# Patient Record
Sex: FEMALE | Race: BLACK OR AFRICAN AMERICAN | NOT HISPANIC OR LATINO | Employment: UNEMPLOYED | URBAN - METROPOLITAN AREA
[De-identification: names, ages, dates, MRNs, and addresses within clinical notes are randomized per-mention and may not be internally consistent; named-entity substitution may affect disease eponyms.]

---

## 2017-03-17 ENCOUNTER — HOSPITAL ENCOUNTER (EMERGENCY)
Facility: MEDICAL CENTER | Age: 67
End: 2017-03-17
Attending: EMERGENCY MEDICINE
Payer: MEDICARE

## 2017-03-17 ENCOUNTER — APPOINTMENT (OUTPATIENT)
Dept: RADIOLOGY | Facility: MEDICAL CENTER | Age: 67
End: 2017-03-17
Attending: EMERGENCY MEDICINE
Payer: MEDICARE

## 2017-03-17 VITALS
BODY MASS INDEX: 30.07 KG/M2 | WEIGHT: 198.41 LBS | HEIGHT: 68 IN | HEART RATE: 81 BPM | RESPIRATION RATE: 18 BRPM | SYSTOLIC BLOOD PRESSURE: 119 MMHG | TEMPERATURE: 98.4 F | OXYGEN SATURATION: 94 % | DIASTOLIC BLOOD PRESSURE: 63 MMHG

## 2017-03-17 DIAGNOSIS — R60.0 LEG EDEMA, RIGHT: ICD-10-CM

## 2017-03-17 LAB
INR PPP: 1.28 (ref 0.87–1.13)
PROTHROMBIN TIME: 16.4 SEC (ref 12–14.6)

## 2017-03-17 PROCEDURE — 99284 EMERGENCY DEPT VISIT MOD MDM: CPT

## 2017-03-17 PROCEDURE — 85610 PROTHROMBIN TIME: CPT

## 2017-03-17 PROCEDURE — 36415 COLL VENOUS BLD VENIPUNCTURE: CPT

## 2017-03-17 PROCEDURE — A9270 NON-COVERED ITEM OR SERVICE: HCPCS | Performed by: EMERGENCY MEDICINE

## 2017-03-17 PROCEDURE — 73590 X-RAY EXAM OF LOWER LEG: CPT | Mod: RT

## 2017-03-17 PROCEDURE — 93971 EXTREMITY STUDY: CPT

## 2017-03-17 PROCEDURE — 700102 HCHG RX REV CODE 250 W/ 637 OVERRIDE(OP): Performed by: EMERGENCY MEDICINE

## 2017-03-17 RX ORDER — HYDROCODONE BITARTRATE AND ACETAMINOPHEN 5; 325 MG/1; MG/1
1 TABLET ORAL ONCE
Status: COMPLETED | OUTPATIENT
Start: 2017-03-17 | End: 2017-03-17

## 2017-03-17 RX ADMIN — HYDROCODONE BITARTRATE AND ACETAMINOPHEN 1 TABLET: 5; 325 TABLET ORAL at 14:31

## 2017-03-17 ASSESSMENT — LIFESTYLE VARIABLES: DO YOU DRINK ALCOHOL: NO

## 2017-03-17 NOTE — ED NOTES
Discharge instructions given, pt verbalized understanding.  Prescription instructions given, pt verbalized understanding.  Understands not to drive or drink alcohol while taking narcotics.  A&ox4.  VSS.  Ambulates out of ER.  Friend to drive pt home.

## 2017-03-17 NOTE — ED NOTES
65 y/o female ambulate to triage   Chief Complaint   Patient presents with   • Leg Pain     right, hit on table about one week ago   • Leg Swelling

## 2017-03-17 NOTE — DISCHARGE INSTRUCTIONS
Keep her leg up and elevated when possible. You may also by compression socks at the pharmacy and wear these during the daytime but do not wear them at night. See your doctor on Monday as planned. Continue your Coumadin. Return here if you have any new or worsening symptoms.

## 2017-03-17 NOTE — ED AVS SNAPSHOT
[employer] Access Code: [email or web address]  Expires: 4/16/2017  4:42 PM    Your email address is not on file at [facility].  Email Addresses are required for you to sign up for [employer], please contact [phone] to verify your personal information and to provide your email address prior to attempting to register for [employer].    Rita Negro  [address] [place] [address]    [employer]t  A secure, online tool to manage your health information     [employer]’s [employer]® is a secure, online tool that connects you to your personalized health information from the privacy of your home -- day or night - making it very easy for you to manage your healthcare. Once the activation process is completed, you can even access your medical information using the [employer] idalmis, which is available for free in the Apple Idalmis store or Google Play store.     To learn more about [employer], visit [email or web address]    There are two levels of access available (as shown below):   My Chart Features  [facility] Primary Care Doctor [facility]  Specialists [facility]  Urgent  Care Non-[facility] Primary Care Doctor   Email your healthcare team securely and privately 24/7 X X X    Manage appointments: schedule your next appointment; view details of past/upcoming appointments X      Request prescription refills. X      View recent personal medical records, including lab and immunizations X X X X   View health record, including health history, allergies, medications X X X X   Read reports about your outpatient visits, procedures, consult and ER notes X X X X   See your discharge summary, which is a recap of your hospital and/or ER visit that includes your diagnosis, lab results, and care plan X X  X     How to register for [employer]:  Once your e-mail address has been verified, follow the following steps to sign up for [employer].     1. Go to  [email or web address]  2. Click on the Sign Up Now box, which takes you to the New Member Sign Up page. You  will need to provide the following information:  a. Enter your Wish Days Access Code exactly as it appears at the top of this page. (You will not need to use this code after you’ve completed the sign-up process. If you do not sign up before the expiration date, you must request a new code.)   b. Enter your date of birth.   c. Enter your home email address.   d. Click Submit, and follow the next screen’s instructions.  3. Create a Larotect ID. This will be your Wish Days login ID and cannot be changed, so think of one that is secure and easy to remember.  4. Create a Wish Days password. You can change your password at any time.  5. Enter your Password Reset Question and Answer. This can be used at a later time if you forget your password.   6. Enter your e-mail address. This allows you to receive e-mail notifications when new information is available in Wish Days.  7. Click Sign Up. You can now view your health information.    For assistance activating your Wish Days account, call [phone]

## 2017-03-17 NOTE — ED AVS SNAPSHOT
3/17/2017          Rita Negro  [address] [address]  [place] [address]    Dear Rita:    [facility] wants to ensure your discharge home is safe and you or your loved ones have had all your questions answered regarding your care after you leave the hospital.    You may receive a telephone call within two days of your discharge.  This call is to make certain you understand your discharge instructions as well as ensure we provided you with the best care possible during your stay with us.     The call will only last approximately 3-5 minutes and will be done by a nurse.    Once again, we want to ensure your discharge home is safe and that you have a clear understanding of any next steps in your care.  If you have any questions or concerns, please do not hesitate to contact us, we are here for you.  Thank you for choosing [facility] for your healthcare needs.    Sincerely,    [place]paha Gaitan    [facility]

## 2017-03-17 NOTE — ED PROVIDER NOTES
ED Provider Note    CHIEF COMPLAINT  Chief Complaint   Patient presents with   • Leg Pain     right, hit on table about one week ago   • Leg Swelling       HPI  Rita Negro is a 66 y.o. female who presents to the emergency department complaining of bruising and swelling of the right lower leg. The patient says that about one week ago she hit the medial aspect of her right lower leg very hard against a bathtub. The area became bruised and swollen locally and now over the last 1 week the swelling has increased to include the entire lower extremity below the knee. The patient is on Coumadin because of a history of DVT 2 years ago. She says that her doctor held her Coumadin for the last 2 days because of an overly elevated INR and she just started back on 6 mg daily today. She does not recognize any other precipitating event or exacerbating alleviating factors and feels that she is otherwise uninjured    REVIEW OF SYSTEMS. She did not strike her head or sustain other injury and has had no chest pain or difficulty breathing or hemoptysis. All other systems negative    PAST MEDICAL HISTORY  Past Medical History   Diagnosis Date   • Hyperlipemia    • Hypertension    • Helicobacter pylori      HX ULCER   • Arthritis    • Anemia      Hx of anemia    • Anxiety    • Depression    • GERD (gastroesophageal reflux disease)    • ASTHMA    • Headache(784.0)    • Migraine    • Hyperlipidemia    • HTN (hypertension) 9/4/2012   • Tobacco dependence 9/4/2012   • Hot flashes, menopausal 9/4/2012   • Osteopenia 9/19/2012   • Vitamin d deficiency 9/19/2012   • Elevated fasting glucose 10/2/2012   • COPD (chronic obstructive pulmonary disease) 7/17/2013     PFT 6/2013   • Prediabetes 9/10/2013     A1c 6.5-9/2013       FAMILY HISTORY  Family History   Problem Relation Age of Onset   • Hypertension Mother    • Heart Disease Mother    • Heart Attack Mother    • Diabetes Mother    • Cancer Father    • Heart Disease Sister    • Heart  "Disease Brother    • Hypertension Sister        SOCIAL HISTORY  Social History     Social History   • Marital Status: Single     Spouse Name: N/A   • Number of Children: N/A   • Years of Education: N/A     Social History Main Topics   • Smoking status: Current Every Day Smoker -- 0.25 packs/day for 45 years     Types: Cigarettes   • Smokeless tobacco: Never Used   • Alcohol Use: Yes   • Drug Use: No   • Sexual Activity: No     Other Topics Concern   • Not on file     Social History Narrative       SURGICAL HISTORY  Past Surgical History   Procedure Laterality Date   • Other abdominal surgery       FORGIEN BODY RAZOR BLADE REMOVAL GASTRIC       CURRENT MEDICATIONS  Home Medications     **Home medications have not yet been reviewed for this encounter**          ALLERGIES  Allergies   Allergen Reactions   • Nkda [No Known Drug Allergy]        PHYSICAL EXAM  VITAL SIGNS: /63 mmHg  Pulse 81  Temp(Src) 36.9 °C (98.4 °F)  Resp 16  Ht 1.727 m (5' 8\")  Wt 90 kg (198 lb 6.6 oz)  BMI 30.18 kg/m2  SpO2 94%   Oxygen saturation is interpreted as adequate  Constitutional: Awake and well-appearing individual in no distress  HENT: No sign of trauma to the head  Eyes: Pupils round extraocular motion present  Neck: Trachea midline no JVD  Cardiovascular: Regular rate and rhythm  Lungs: Clear and equal bilaterally with no apparent difficulty breathing  Skin: Warm and dry  Musculoskeletal: There is a large bruise on the inner aspect of the right knee and right proximal tibial area. The patient is able to ambulate and bear full weight on the leg. There is 2+ pitting edema diffusely throughout the lower extremity. The foot is warm and well perfused with a palpable dorsalis pedis pulse  Neurologic: Awake and verbal and ambulatory    Laboratory  INR is subtherapeutic at 1.28 however as noted above the patient's Coumadin was held for the last 2 days because of an INR that was too elevated    Radiology  LE VENOUS DUPLEX (Specify " in Comments Left, Right Or Bilateral)   Preliminary Result      DX-TIBIA AND FIBULA RIGHT   Final Result      1.  Focal soft tissue swelling anteriorly over the proximal tibia.   2.  No fracture of the RIGHT tibia or fibula.   3.  Diffuse subcutaneous edema of the lower leg.        MEDICAL DECISION MAKING and DISPOSITION  The patient was given one Norco tablet for discomfort in the emergency Department she's been up and walking around. I reviewed all the findings with her and at this point in time I think that the patient has a large bruise and some edema probably secondary to traumatic subcutaneous bleeding but there is no evidence of fracture and there is no evidence of DVT. I think it safe for the patient to go home I recommended that she continue her Coumadin as planned and the patient has a follow-up planned with her doctor on Monday. The patient is asking what she can do about the swelling and I have recommended that she keep the leg elevated when possible and she may also by compression socks at the pharmacy and use these when she has to be up and walking around but I have advised her that she should remove these when she is at rest or sleeping. The patient is to return here if she develops new or worsening symptoms    IMPRESSION  1. Contusion and edema to the right lower extremity    Electronically signed by: Levi Romano, 3/17/2017 4:31 PM

## 2017-03-17 NOTE — ED AVS SNAPSHOT
Home Care Instructions                                                                                                                Rita Negro   MRN: [id number]    Department:  [facility], Emergency Dept   Date of Visit:  3/17/2017            [facility], Emergency Dept    [address]    [place] [address]    Phone:  [phone]      You were seen by     Levi Romano M.D.      Your Diagnosis Was     Leg edema, right     R60.0       These are the medications you received during your hospitalization from 03/17/2017 1258 to 03/17/2017 1642     Date/Time Order Dose Route Action    03/17/2017 1431 hydrocodone-acetaminophen (NORCO) 5-325 MG per tablet 1 Tab 1 Tab Oral Given      Medication Information     Review all of your home medications and newly ordered medications with your primary doctor and/or pharmacist as soon as possible. Follow medication instructions as directed by your doctor and/or pharmacist.     Please keep your complete medication list with you and share with your physician. Update the information when medications are discontinued, doses are changed, or new medications (including over-the-counter products) are added; and carry medication information at all times in the event of emergency situations.               Medication List      ASK your doctor about these medications        Instructions    Morning Afternoon Evening Bedtime    atorvastatin 10 MG Tabs   Commonly known as:  LIPITOR        Take 1 Tab by mouth every day.   Dose:  10 mg                        benzonatate 200 MG capsule   Commonly known as:  TESSALON        Take 1 Cap by mouth 3 times a day as needed for Cough for up to 20 doses.   Dose:  200 mg                        fluoxetine 20 MG Caps   Commonly known as:  PROZAC        TAKE ONE CAPSULE BY MOUTH EVERY DAY FOR HOT FLASHES                        ipratropium-albuterol 0.5-2.5 (3) MG/3ML nebulizer solution   Commonly known as:  DUONEB           3 mL by Nebulization route every 6 hours as needed for Shortness of Breath.   Dose:  3 mL                        lisinopril-hydrochlorothiazide 20-25 MG per tablet   Commonly known as:  PRINZIDE, ZESTORETIC        Take 1 Tab by mouth every day.   Dose:  1 Tab                        oxycodone-acetaminophen 5-325 MG Tabs   Commonly known as:  PERCOCET        Take 1-2 Tabs by mouth every 6 hours as needed for Severe Pain.   Dose:  1-2 Tab                        tramadol 50 MG Tabs   Commonly known as:  ULTRAM        Take 1-2 Tabs by mouth every four hours as needed.   Dose:   mg                        warfarin 6 MG Tabs   Commonly known as:  COUMADIN        Take 1 Tab by mouth every day.   Dose:  6 mg                                Procedures and tests performed during your visit     DX-TIBIA AND FIBULA RIGHT    LE VENOUS DUPLEX (Specify in Comments Left, Right Or Bilateral)    PROTHROMBIN TIME (INR)        Discharge Instructions       Keep her leg up and elevated when possible. You may also by compression socks at the pharmacy and wear these during the daytime but do not wear them at night. See your doctor on Monday as planned. Continue your Coumadin. Return here if you have any new or worsening symptoms.          Patient Information     Patient Information    Following emergency treatment: all patient requiring follow-up care must return either to a private physician or a clinic if your condition worsens before you are able to obtain further medical attention, please return to the emergency room.     Billing Information    At [facility], we work to make the billing process streamlined for our patients.  Our Representatives are here to answer any questions you may have regarding your hospital bill.  If you have insurance coverage and have supplied your insurance information to us, we will submit a claim to your insurer on your behalf.  Should you have any questions regarding your bill, we can be  reached online or by phone as follows:  Online: You are able pay your bills online or live chat with our representatives about any billing questions you may have. We are here to help Monday - Friday from 8:00am to 7:30pm and 9:00am - 12:00pm on Saturdays.  Please visit [email or web address]  for more information.   Phone:  [phone] or [phone]    Please note that your emergency physician, surgeon, pathologist, radiologist, anesthesiologist, and other specialists are not employed by [facility] and will therefore bill separately for their services.  Please contact them directly for any questions concerning their bills at the numbers below:     Emergency Physician Services:  [phone]  [facility]:  [phone]  [facility]:  [phone]  [facility]:  [phone]    1. Your final bill may vary from the amount quoted upon discharge if all procedures are not complete at that time, or if your doctor has additional procedures of which we are not aware. You will receive an additional bill if you return to the Emergency Department at [facility] for suture removal regardless of the facility of which the sutures were placed.     2. Please arrange for settlement of this account at the emergency registration.    3. All self-pay accounts are due in full at the time of treatment.  If you are unable to meet this obligation then payment is expected within 4-5 days.     4. If you have had radiology studies (CT, X-ray, Ultrasound, MRI), you have received a preliminary result during your emergency department visit. Please contact the radiology department [phone] to receive a copy of your final result. Please discuss the Final result with your primary physician or with the follow up physician provided.     Crisis Hotline:  [place] Crisis Hotline:  2-534-GYCEMCO or 1-139.170.3942  [place] Crisis Hotline:    1-466.485.4823 or  522.803.6447         ED Discharge Follow Up Questions    1. In order to provide you with very good care, we would like to follow up with a phone call in the next few days.  May we have your permission to contact you?     YES /  NO    2. What is the best phone number to call you? (       )_____-__________    3. What is the best time to call you?      Morning  /  Afternoon  /  Evening                   Patient Signature:  ____________________________________________________________    Date:  ____________________________________________________________

## 2017-08-17 ENCOUNTER — HOSPITAL ENCOUNTER (EMERGENCY)
Facility: MEDICAL CENTER | Age: 67
End: 2017-08-17
Attending: EMERGENCY MEDICINE
Payer: MEDICARE

## 2017-08-17 ENCOUNTER — APPOINTMENT (OUTPATIENT)
Dept: RADIOLOGY | Facility: MEDICAL CENTER | Age: 67
End: 2017-08-17
Attending: EMERGENCY MEDICINE
Payer: MEDICARE

## 2017-08-17 VITALS
HEART RATE: 70 BPM | HEIGHT: 68 IN | WEIGHT: 182.76 LBS | TEMPERATURE: 97.2 F | DIASTOLIC BLOOD PRESSURE: 68 MMHG | SYSTOLIC BLOOD PRESSURE: 140 MMHG | RESPIRATION RATE: 16 BRPM | BODY MASS INDEX: 27.7 KG/M2 | OXYGEN SATURATION: 96 %

## 2017-08-17 DIAGNOSIS — F41.8 SITUATIONAL ANXIETY: ICD-10-CM

## 2017-08-17 DIAGNOSIS — R51.9 CHRONIC NONINTRACTABLE HEADACHE, UNSPECIFIED HEADACHE TYPE: ICD-10-CM

## 2017-08-17 DIAGNOSIS — Z79.01 CHRONIC ANTICOAGULATION: ICD-10-CM

## 2017-08-17 DIAGNOSIS — G43.009 MIGRAINE WITHOUT AURA AND WITHOUT STATUS MIGRAINOSUS, NOT INTRACTABLE: ICD-10-CM

## 2017-08-17 DIAGNOSIS — G89.29 CHRONIC NONINTRACTABLE HEADACHE, UNSPECIFIED HEADACHE TYPE: ICD-10-CM

## 2017-08-17 DIAGNOSIS — N39.0 ACUTE UTI: ICD-10-CM

## 2017-08-17 LAB
APPEARANCE UR: CLEAR
BACTERIA #/AREA URNS HPF: NEGATIVE /HPF
BILIRUB UR QL STRIP.AUTO: NEGATIVE
COLOR UR: YELLOW
EPI CELLS #/AREA URNS HPF: ABNORMAL /HPF
GLUCOSE UR STRIP.AUTO-MCNC: NEGATIVE MG/DL
HYALINE CASTS #/AREA URNS LPF: ABNORMAL /LPF
KETONES UR STRIP.AUTO-MCNC: NEGATIVE MG/DL
LEUKOCYTE ESTERASE UR QL STRIP.AUTO: ABNORMAL
MICRO URNS: ABNORMAL
NITRITE UR QL STRIP.AUTO: NEGATIVE
PH UR STRIP.AUTO: 7 [PH]
PROT UR QL STRIP: NEGATIVE MG/DL
RBC # URNS HPF: ABNORMAL /HPF
RBC UR QL AUTO: ABNORMAL
RENAL EPI CELLS #/AREA URNS HPF: ABNORMAL /HPF
SP GR UR STRIP.AUTO: 1
UROBILINOGEN UR STRIP.AUTO-MCNC: 0.2 MG/DL
WBC #/AREA URNS HPF: ABNORMAL /HPF

## 2017-08-17 PROCEDURE — 700111 HCHG RX REV CODE 636 W/ 250 OVERRIDE (IP): Performed by: EMERGENCY MEDICINE

## 2017-08-17 PROCEDURE — 99284 EMERGENCY DEPT VISIT MOD MDM: CPT

## 2017-08-17 PROCEDURE — 96372 THER/PROPH/DIAG INJ SC/IM: CPT

## 2017-08-17 PROCEDURE — 70450 CT HEAD/BRAIN W/O DYE: CPT

## 2017-08-17 PROCEDURE — 81001 URINALYSIS AUTO W/SCOPE: CPT

## 2017-08-17 RX ORDER — ONDANSETRON 2 MG/ML
4 INJECTION INTRAMUSCULAR; INTRAVENOUS ONCE
Status: COMPLETED | OUTPATIENT
Start: 2017-08-17 | End: 2017-08-17

## 2017-08-17 RX ORDER — CEFDINIR 300 MG/1
300 CAPSULE ORAL 2 TIMES DAILY
Qty: 14 CAP | Refills: 0 | Status: SHIPPED | OUTPATIENT
Start: 2017-08-17 | End: 2017-08-24

## 2017-08-17 RX ORDER — CEFDINIR 300 MG/1
300 CAPSULE ORAL 2 TIMES DAILY
Qty: 14 CAP | Refills: 0 | Status: SHIPPED | OUTPATIENT
Start: 2017-08-17 | End: 2017-08-17

## 2017-08-17 RX ORDER — MORPHINE SULFATE 4 MG/ML
4 INJECTION, SOLUTION INTRAMUSCULAR; INTRAVENOUS ONCE
Status: COMPLETED | OUTPATIENT
Start: 2017-08-17 | End: 2017-08-17

## 2017-08-17 RX ADMIN — MORPHINE SULFATE 4 MG: 4 INJECTION INTRAVENOUS at 10:53

## 2017-08-17 RX ADMIN — ONDANSETRON 4 MG: 2 INJECTION INTRAMUSCULAR; INTRAVENOUS at 10:53

## 2017-08-17 ASSESSMENT — ENCOUNTER SYMPTOMS
FEVER: 0
HEADACHES: 1
SHORTNESS OF BREATH: 0
NAUSEA: 1

## 2017-08-17 ASSESSMENT — PAIN SCALES - GENERAL
PAINLEVEL_OUTOF10: 4
PAINLEVEL_OUTOF10: 4

## 2017-08-17 NOTE — ED NOTES
Pt given d/c instructions and rx.  Reviewed and verbalized understanding.  Pt ambulated out with steady gait.

## 2017-08-17 NOTE — ED AVS SNAPSHOT
8/17/2017    Rita Negro  [address] [address]  [place] [address]    Dear Rita:    [facility] wants to ensure your discharge home is safe and you or your loved ones have had all of your questions answered regarding your care after you leave the hospital.    Below is a list of resources and contact information should you have any questions regarding your hospital stay, follow-up instructions, or active medical symptoms.    Questions or Concerns Regarding… Contact   Medical Questions Related to Your Discharge  (7 days a week, 8am-5pm) Contact a Nurse Care Coordinator   [phone]   Medical Questions Not Related to Your Discharge  (24 hours a day / 7 days a week)  Contact the Nurse Health Line   [phone]    Medications or Discharge Instructions Refer to your discharge packet   or contact your [facility] Primary Care Provider   [phone]   Follow-up Appointment(s) Schedule your appointment via myMedScore   or contact Scheduling [phone]   Billing Review your statement via myMedScore  or contact Billing [phone]   Medical Records Review your records via myMedScore   or contact Medical Records [phone]     You may receive a telephone call within two days of discharge. This call is to make certain you understand your discharge instructions and have the opportunity to have any questions answered. You can also easily access your medical information, test results and upcoming appointments via the myMedScore free online health management tool. You can learn more and sign up at [email or web address]. For assistance setting up your myMedScore account, please call [phone].    Once again, we want to ensure your discharge home is safe and that you have a clear understanding of any next steps in your care. If you have any questions or concerns, please do not hesitate to contact us, we are here for you. Thank you for choosing [facility] for your healthcare needs.    Sincerely,    Your [facility] Healthcare Team

## 2017-08-17 NOTE — ED AVS SNAPSHOT
[employer] Access Code: [email or web address]  Expires: 9/16/2017 12:12 PM    Your email address is not on file at [facility].  Email Addresses are required for you to sign up for [employer], please contact [phone] to verify your personal information and to provide your email address prior to attempting to register for [employer].    Rita Negro  [address] [address]  [place] [address]    [employer]  A secure, online tool to manage your health information     [employer]’s [employer]® is a secure, online tool that connects you to your personalized health information from the privacy of your home -- day or night - making it very easy for you to manage your healthcare. Once the activation process is completed, you can even access your medical information using the [employer] idalmis, which is available for free in the Apple Idalmis store or Google Play store.     To learn more about [employer], visit [email or web address] Fuel/[employer]    There are two levels of access available (as shown below):   My Chart Features  [facility] Primary Care Doctor [facility]  Specialists [facility]  Urgent  Care Non-[facility] Primary Care Doctor   Email your healthcare team securely and privately 24/7 X X X    Manage appointments: schedule your next appointment; view details of past/upcoming appointments X      Request prescription refills. X      View recent personal medical records, including lab and immunizations X X X X   View health record, including health history, allergies, medications X X X X   Read reports about your outpatient visits, procedures, consult and ER notes X X X X   See your discharge summary, which is a recap of your hospital and/or ER visit that includes your diagnosis, lab results, and care plan X X  X     How to register for [employer]:  Once your e-mail address has been verified, follow the following steps to sign up for [employer].     1. Go to  [email or web address]  2. Click on the Sign Up Now box, which takes you to the New Member Sign Up page. You  will need to provide the following information:  a. Enter your [employer] Access Code exactly as it appears at the top of this page. (You will not need to use this code after you’ve completed the sign-up process. If you do not sign up before the expiration date, you must request a new code.)   b. Enter your date of birth.   c. Enter your home email address.   d. Click Submit, and follow the next screen’s instructions.  3. Create a Inovise Medicalt ID. This will be your RotaPost login ID and cannot be changed, so think of one that is secure and easy to remember.  4. Create a RotaPost password. You can change your password at any time.  5. Enter your Password Reset Question and Answer. This can be used at a later time if you forget your password.   6. Enter your e-mail address. This allows you to receive e-mail notifications when new information is available in RotaPost.  7. Click Sign Up. You can now view your health information.    For assistance activating your [employer] account, call [phone]

## 2017-08-17 NOTE — ED AVS SNAPSHOT
Home Care Instructions                                                                                                                Rita Negro   MRN: [id number]    Department:  [facility], Emergency Dept   Date of Visit:  8/17/2017            [facility], Emergency Dept    [address]    [place] [address]    Phone:  [phone]      You were seen by     Buddy Miramontes M.D.      Your Diagnosis Was     Chronic nonintractable headache, unspecified headache type     R51       These are the medications you received during your hospitalization from 08/17/2017 0942 to 08/17/2017 1404     Date/Time Order Dose Route Action    08/17/2017 1053 morphine (pf) 4 mg/ml injection 4 mg 4 mg Intramuscular Given    08/17/2017 1053 ondansetron (ZOFRAN) syringe/vial injection 4 mg 4 mg Intramuscular Given      Follow-up Information     1. Follow up with Your Physician.    Specialty:  Emergency Medicine    Contact information    Varies        Medication Information     Review all of your home medications and newly ordered medications with your primary doctor and/or pharmacist as soon as possible. Follow medication instructions as directed by your doctor and/or pharmacist.     Please keep your complete medication list with you and share with your physician. Update the information when medications are discontinued, doses are changed, or new medications (including over-the-counter products) are added; and carry medication information at all times in the event of emergency situations.               Medication List      START taking these medications        Instructions    Morning Afternoon Evening Bedtime    cefdinir 300 MG Caps   Commonly known as:  OMNICEF        Take 1 Cap by mouth 2 times a day for 7 days.   Dose:  300 mg                          ASK your doctor about these medications        Instructions    Morning Afternoon Evening Bedtime    atorvastatin 10 MG Tabs   Commonly known  as:  LIPITOR        Take 1 Tab by mouth every day.   Dose:  10 mg                        benzonatate 200 MG capsule   Commonly known as:  TESSALON        Take 1 Cap by mouth 3 times a day as needed for Cough for up to 20 doses.   Dose:  200 mg                        fluoxetine 20 MG Caps   Commonly known as:  PROZAC        TAKE ONE CAPSULE BY MOUTH EVERY DAY FOR HOT FLASHES                        ipratropium-albuterol 0.5-2.5 (3) MG/3ML nebulizer solution   Commonly known as:  DUONEB        3 mL by Nebulization route every 6 hours as needed for Shortness of Breath.   Dose:  3 mL                        lisinopril-hydrochlorothiazide 20-25 MG per tablet   Commonly known as:  PRINZIDE, ZESTORETIC        Take 1 Tab by mouth every day.   Dose:  1 Tab                        oxycodone-acetaminophen 5-325 MG Tabs   Commonly known as:  PERCOCET        Take 1-2 Tabs by mouth every 6 hours as needed for Severe Pain.   Dose:  1-2 Tab                        tramadol 50 MG Tabs   Commonly known as:  ULTRAM        Take 1-2 Tabs by mouth every four hours as needed.   Dose:   mg                        warfarin 6 MG Tabs   Commonly known as:  COUMADIN        Take 1 Tab by mouth every day.   Dose:  6 mg                             Where to Get Your Medications      You can get these medications from any pharmacy     Bring a paper prescription for each of these medications    - cefdinir 300 MG Caps            Procedures and tests performed during your visit     CT-HEAD W/O    URINALYSIS    URINE MICROSCOPIC (W/UA)        Discharge Instructions       Return to the ER for worsening headache, or for any new  Symptoms or concerns.     Migraine Headache  A migraine headache is an intense, throbbing pain on one or both sides of your head. A migraine can last for 30 minutes to several hours.  CAUSES   The exact cause of a migraine headache is not always known. However, a migraine may be caused when nerves in the brain become irritated  and release chemicals that cause inflammation. This causes pain.  Certain things may also trigger migraines, such as:  · Alcohol.  · Smoking.  · Stress.  · Menstruation.  · Aged cheeses.  · Foods or drinks that contain nitrates, glutamate, aspartame, or tyramine.  · Lack of sleep.  · Chocolate.  · Caffeine.  · Hunger.  · Physical exertion.  · Fatigue.  · Medicines used to treat chest pain (nitroglycerine), birth control pills, estrogen, and some blood pressure medicines.  SIGNS AND SYMPTOMS  · Pain on one or both sides of your head.  · Pulsating or throbbing pain.  · Severe pain that prevents daily activities.  · Pain that is aggravated by any physical activity.  · Nausea, vomiting, or both.  · Dizziness.  · Pain with exposure to bright lights, loud noises, or activity.  · General sensitivity to bright lights, loud noises, or smells.  Before you get a migraine, you may get warning signs that a migraine is coming (aura). An aura may include:  · Seeing flashing lights.  · Seeing bright spots, halos, or zigzag lines.  · Having tunnel vision or blurred vision.  · Having feelings of numbness or tingling.  · Having trouble talking.  · Having muscle weakness.  DIAGNOSIS   A migraine headache is often diagnosed based on:  · Symptoms.  · Physical exam.  · A CT scan or MRI of your head. These imaging tests cannot diagnose migraines, but they can help rule out other causes of headaches.  TREATMENT  Medicines may be given for pain and nausea. Medicines can also be given to help prevent recurrent migraines.   HOME CARE INSTRUCTIONS  · Only take over-the-counter or prescription medicines for pain or discomfort as directed by your health care provider. The use of long-term narcotics is not recommended.  · Lie down in a dark, quiet room when you have a migraine.  · Keep a journal to find out what may trigger your migraine headaches. For example, write down:  ¨ What you eat and drink.  ¨ How much sleep you get.  ¨ Any change to your  diet or medicines.  · Limit alcohol consumption.  · Quit smoking if you smoke.  · Get 7-9 hours of sleep, or as recommended by your health care provider.  · Limit stress.  · Keep lights dim if bright lights bother you and make your migraines worse.  SEEK IMMEDIATE MEDICAL CARE IF:   · Your migraine becomes severe.  · You have a fever.  · You have a stiff neck.  · You have vision loss.  · You have muscular weakness or loss of muscle control.  · You start losing your balance or have trouble walking.  · You feel faint or pass out.  · You have severe symptoms that are different from your first symptoms.  MAKE SURE YOU:   · Understand these instructions.  · Will watch your condition.  · Will get help right away if you are not doing well or get worse.     This information is not intended to replace advice given to you by your health care provider. Make sure you discuss any questions you have with your health care provider.  Please follow up with a primary physician for blood pressure management, diabetic screening, and all other preventive health concerns.      Document Released: 12/18/2006 Document Revised: 01/08/2016 Document Reviewed: 08/25/2014  [employer] Interactive Patient Education ©2016 [employer]            Patient Information     Patient Information    Following emergency treatment: all patient requiring follow-up care must return either to a private physician or a clinic if your condition worsens before you are able to obtain further medical attention, please return to the emergency room.     Billing Information    At [facility], we work to make the billing process streamlined for our patients.  Our Representatives are here to answer any questions you may have regarding your hospital bill.  If you have insurance coverage and have supplied your insurance information to us, we will submit a claim to your insurer on your behalf.  Should you have any questions regarding your bill, we can be reached online or  by phone as follows:  Online: You are able pay your bills online or live chat with our representatives about any billing questions you may have. We are here to help Monday - Friday from 8:00am to 7:30pm and 9:00am - 12:00pm on Saturdays.  Please visit [email or web address]  for more information.   Phone:  [phone] or [phone]    Please note that your emergency physician, surgeon, pathologist, radiologist, anesthesiologist, and other specialists are not employed by [facility] and will therefore bill separately for their services.  Please contact them directly for any questions concerning their bills at the numbers below:     Emergency Physician Services:  [phone]  [facility]:  [phone]  [facility]:  [phone]  [facility]:  [phone]    1. Your final bill may vary from the amount quoted upon discharge if all procedures are not complete at that time, or if your doctor has additional procedures of which we are not aware. You will receive an additional bill if you return to the Emergency Department at [facility] for suture removal regardless of the facility of which the sutures were placed.     2. Please arrange for settlement of this account at the emergency registration.    3. All self-pay accounts are due in full at the time of treatment.  If you are unable to meet this obligation then payment is expected within 4-5 days.     4. If you have had radiology studies (CT, X-ray, Ultrasound, MRI), you have received a preliminary result during your emergency department visit. Please contact the radiology department [phone] to receive a copy of your final result. Please discuss the Final result with your primary physician or with the follow up physician provided.     Crisis Hotline:  [place] Crisis Hotline:  3-508-TEEMZKX or 1-495.840.8148  [place] Crisis Hotline:    1-326.479.6608 or 575-703-8484         ED  Discharge Follow Up Questions    1. In order to provide you with very good care, we would like to follow up with a phone call in the next few days.  May we have your permission to contact you?     YES /  NO    2. What is the best phone number to call you? (       )_____-__________    3. What is the best time to call you?      Morning  /  Afternoon  /  Evening                   Patient Signature:  ____________________________________________________________    Date:  ____________________________________________________________

## 2017-08-17 NOTE — DISCHARGE INSTRUCTIONS
Return to the ER for worsening headache, or for any new  Symptoms or concerns.     Migraine Headache  A migraine headache is an intense, throbbing pain on one or both sides of your head. A migraine can last for 30 minutes to several hours.  CAUSES   The exact cause of a migraine headache is not always known. However, a migraine may be caused when nerves in the brain become irritated and release chemicals that cause inflammation. This causes pain.  Certain things may also trigger migraines, such as:  · Alcohol.  · Smoking.  · Stress.  · Menstruation.  · Aged cheeses.  · Foods or drinks that contain nitrates, glutamate, aspartame, or tyramine.  · Lack of sleep.  · Chocolate.  · Caffeine.  · Hunger.  · Physical exertion.  · Fatigue.  · Medicines used to treat chest pain (nitroglycerine), birth control pills, estrogen, and some blood pressure medicines.  SIGNS AND SYMPTOMS  · Pain on one or both sides of your head.  · Pulsating or throbbing pain.  · Severe pain that prevents daily activities.  · Pain that is aggravated by any physical activity.  · Nausea, vomiting, or both.  · Dizziness.  · Pain with exposure to bright lights, loud noises, or activity.  · General sensitivity to bright lights, loud noises, or smells.  Before you get a migraine, you may get warning signs that a migraine is coming (aura). An aura may include:  · Seeing flashing lights.  · Seeing bright spots, halos, or zigzag lines.  · Having tunnel vision or blurred vision.  · Having feelings of numbness or tingling.  · Having trouble talking.  · Having muscle weakness.  DIAGNOSIS   A migraine headache is often diagnosed based on:  · Symptoms.  · Physical exam.  · A CT scan or MRI of your head. These imaging tests cannot diagnose migraines, but they can help rule out other causes of headaches.  TREATMENT  Medicines may be given for pain and nausea. Medicines can also be given to help prevent recurrent migraines.   HOME CARE INSTRUCTIONS  · Only take  over-the-counter or prescription medicines for pain or discomfort as directed by your health care provider. The use of long-term narcotics is not recommended.  · Lie down in a dark, quiet room when you have a migraine.  · Keep a journal to find out what may trigger your migraine headaches. For example, write down:  ¨ What you eat and drink.  ¨ How much sleep you get.  ¨ Any change to your diet or medicines.  · Limit alcohol consumption.  · Quit smoking if you smoke.  · Get 7-9 hours of sleep, or as recommended by your health care provider.  · Limit stress.  · Keep lights dim if bright lights bother you and make your migraines worse.  SEEK IMMEDIATE MEDICAL CARE IF:   · Your migraine becomes severe.  · You have a fever.  · You have a stiff neck.  · You have vision loss.  · You have muscular weakness or loss of muscle control.  · You start losing your balance or have trouble walking.  · You feel faint or pass out.  · You have severe symptoms that are different from your first symptoms.  MAKE SURE YOU:   · Understand these instructions.  · Will watch your condition.  · Will get help right away if you are not doing well or get worse.     This information is not intended to replace advice given to you by your health care provider. Make sure you discuss any questions you have with your health care provider.  Please follow up with a primary physician for blood pressure management, diabetic screening, and all other preventive health concerns.      Document Released: 12/18/2006 Document Revised: 01/08/2016 Document Reviewed: 08/25/2014  [employer] Interactive Patient Education ©2016 [employer]

## 2017-08-17 NOTE — ED NOTES
Chief Complaint   Patient presents with   • Headache     Patient reports waking up with a headache, states history of headaches. Says her Percocet is not helping. VSS, told to inform RN of changes.

## 2017-08-17 NOTE — ED PROVIDER NOTES
ED Provider Note    Scribed for Buddy Miramontes M.D. by Abdelrahman Roberts. 8/17/2017, 10:27 AM.    Primary care provider: Shivam Cazares M.D.  Means of arrival: walk-in  History obtained from: patient  History limited by: none    CHIEF COMPLAINT  Chief Complaint   Patient presents with   • Headache       HPI  Rita Negro is a 67 y.o. female who presents to the Emergency Department for evaluation of headache onset this morning. Per patient, her headache was gradual onset she woke up this morning, and it has been worsening since onset. Her headache has been constant since onset. The patient took Percocet form her headache without relief. Patient is experiencing associated nausea. She reports a history of chronic headaches, but states her current headache is different from her usual headaches due to the associated nausea. The patient also confirms a history of hypertension and COPD. She takes Lisinopril and Coumadin. Patient takes Coumadin secondary to a history of blood clots. She denies vomiting, fevers, shortness of breath, chest pain.     REVIEW OF SYSTEMS  Review of Systems   Constitutional: Negative for fever.   Respiratory: Negative for shortness of breath.    Cardiovascular: Negative for chest pain.   Gastrointestinal: Positive for nausea.   Neurological: Positive for headaches.       E.    PAST MEDICAL HISTORY   has a past medical history of Hyperlipemia; Hypertension; Helicobacter pylori; Arthritis; Anemia; Anxiety; Depression; GERD (gastroesophageal reflux disease); ASTHMA; Headache; Migraine; Hyperlipidemia; HTN (hypertension) (9/4/2012); Tobacco dependence (9/4/2012); Hot flashes, menopausal (9/4/2012); Osteopenia (9/19/2012); Vitamin d deficiency (9/19/2012); Elevated fasting glucose (10/2/2012); COPD (chronic obstructive pulmonary disease) (CMS-HCC) (7/17/2013); and Prediabetes (9/10/2013).    SURGICAL HISTORY   has past surgical history that includes other abdominal surgery.    SOCIAL  "HISTORY  Social History   Substance Use Topics   • Smoking status: Current Every Day Smoker -- 0.25 packs/day for 45 years     Types: Cigarettes   • Smokeless tobacco: Never Used   • Alcohol Use: Yes      History   Drug Use No       FAMILY HISTORY  Family History   Problem Relation Age of Onset   • Hypertension Mother    • Heart Disease Mother    • Heart Attack Mother    • Diabetes Mother    • Cancer Father    • Heart Disease Sister    • Heart Disease Brother    • Hypertension Sister        CURRENT MEDICATIONS  No current facility-administered medications on file prior to encounter.     Current Outpatient Prescriptions on File Prior to Encounter   Medication Sig Dispense Refill   • tramadol (ULTRAM) 50 MG Tab Take 1-2 Tabs by mouth every four hours as needed. 30 Tab 0   • benzonatate (TESSALON) 200 MG capsule Take 1 Cap by mouth 3 times a day as needed for Cough for up to 20 doses. 20 Cap 0   • ipratropium-albuterol (DUONEB) 0.5-2.5 (3) MG/3ML nebulizer solution 3 mL by Nebulization route every 6 hours as needed for Shortness of Breath. 30 Vial 0   • oxycodone-acetaminophen (PERCOCET) 5-325 MG Tab Take 1-2 Tabs by mouth every 6 hours as needed for Severe Pain. 15 Tab 0   • warfarin (COUMADIN) 6 MG TABS Take 1 Tab by mouth every day. 30 Tab 3   • atorvastatin (LIPITOR) 10 MG TABS Take 1 Tab by mouth every day. 30 Tab 3   • lisinopril-hydrochlorothiazide (PRINZIDE, ZESTORETIC) 20-25 MG per tablet Take 1 Tab by mouth every day. 30 Tab 3   • fluoxetine (PROZAC) 20 MG CAPS TAKE ONE CAPSULE BY MOUTH EVERY DAY FOR HOT FLASHES 30 Cap 3      ALLERGIES  Allergies   Allergen Reactions   • Nkda [No Known Drug Allergy]        PHYSICAL EXAM  VITAL SIGNS: /70 mmHg  Pulse 83  Temp(Src) 36.2 °C (97.2 °F) (Temporal)  Resp 16  Ht 1.727 m (5' 7.99\")  Wt 82.9 kg (182 lb 12.2 oz)  BMI 27.80 kg/m2  SpO2 97%  Vitals reviewed.  Constitutional: Well developed, Well nourished, No acute distress, Non-toxic appearance.   HENT: " Normocephalic, Atraumatic, Bilateral external ears normal, Oropharynx moist, No oral exudates, Nose normal.   Eyes: PERRL, EOMI, Conjunctiva normal, No discharge.   Neck: Normal range of motion, No tenderness, Supple, No stridor.   Cardiovascular: Normal heart rate, Normal rhythm, No murmurs, No rubs, No gallops.   Thorax & Lungs: Normal breath sounds, No respiratory distress, No wheezing, No chest tenderness.   Abdomen: Bowel sounds normal, Soft, No tenderness  Skin: Warm, Dry, No erythema, No rash.   Back: No tenderness, No CVA tenderness.   Musculoskeletal: Good range of motion in all major joints. No edema. No tenderness to palpation or major deformities noted.   Neurologic: Alert, Normal motor function, Normal sensory function, No focal deficits noted.   Psychiatric: Affect normal    LABS  Results for orders placed or performed during the hospital encounter of 08/17/17   URINALYSIS   Result Value Ref Range    Color Yellow     Character Clear     Specific Gravity 1.003 <1.035    Ph 7.0 5.0-8.0    Glucose Negative Negative mg/dL    Ketones Negative Negative mg/dL    Protein Negative Negative mg/dL    Bilirubin Negative Negative    Urobilinogen, Urine 0.2 Negative    Nitrite Negative Negative    Leukocyte Esterase Large (A) Negative    Occult Blood Trace (A) Negative    Micro Urine Req Microscopic         All labs reviewed by me.     RADIOLOGY  CT-HEAD W/O   Final Result      No acute intracranial abnormality is identified.      Atrophy           The radiologist's interpretation of all radiological studies have been reviewed by me.    COURSE & MEDICAL DECISION MAKING  Pertinent Labs & Imaging studies reviewed. (See chart for details)    Obtained and reviewed past medical records from previous nerves.  She has a history of migraines and chronic headaches.    10:27 AM Patient seen and examined at bedside. The patient presents with headache, and the differential diagnosis includes but is not limited to acute  exacerbation of chronic headache, intercranial hemorrhage as a result of Coumadin use. Ordered for head CT to evaluate. Patient will be treated with 4 mg Morphine, 4 mg Zofran for her symptoms.  I discussed the treatment plan as above with the patient. She understood and verbalized agreement.    12:14 PM - I reevaluated the patient at bedside. She states her headache feels improved after given medication, but she is also experiencing abdominal pain. I updated the patient on her CT results as above. Ordered UA to further evaluate. I discussed the plan with the patient. She understood and verbalized agreement.     2:04 PM - I reevaluated the patient at bedside. She is resting comfortably. I updated the patient on her lab results as above. I will send the patient home with a prescription for Omnicef. I welcomed the patient to return to the ED with new or worsening symptoms. She understood and verbalized agreement.    The patient presents with a headache.  She has chronic headaches and this appears to be an acute exacerbation of chronic headache.  It came on gradually to little different in quality in nature, so is concerned enough to do a head CT because she is on chronic anticoagulation.  Head CT is negative for acute hemorrhage.  History is not suggestive of some rectum and hemorrhage.  On that she requires a lumbar puncture.  She'll she does not have a mass or extra-axial dermatome.  This is not meningitis or sinusitis.  She gives a pain medicine reassessment, she felt much better.    She then complained of some vague abdominal discomfort and dysuria.  Both her initial and repeat abdominal exams are benign without any tenderness or peritonitis.  She had no complaints of this to me initially or to the nurse.  Did do urinalysis because of urinary complaints, and a urinalysis is positive.  She was treated Omnicef.    She's been reassessed.  She feels much better.  She has no abdominal pain, exam is again nontender  without peritonitis.  She was also tolerating fluids.  She will be discharged with return precautions and follow-up.  Questions are answered.  She looks well and she is discharged in good condition.    The patient will return for new or worsening symptoms and is stable at the time of discharge.    The patient is referred to a primary physician for blood pressure management, diabetic screening, and for all other preventative health concerns.    DISPOSITION:  Patient will be discharged home in stable condition.    FOLLOW UP:  Your Physician  Varies            OUTPATIENT MEDICATIONS:  New Prescriptions    CEFDINIR (OMNICEF) 300 MG CAP    Take 1 Cap by mouth 2 times a day for 7 days.        FINAL IMPRESSION  1. Chronic nonintractable headache, unspecified headache type    2. Chronic anticoagulation    3. Migraine without aura and without status migrainosus, not intractable    4. Situational anxiety          Abdelrahman MCLAUGHLIN (Scribe), am scribing for, and in the presence of, Buddy Miramontes M.D..    Electronically signed by: Abdelrahman Roberts (Scribe), 8/17/2017    Buddy MCLAUGHLIN M.D. personally performed the services described in this documentation, as scribed by Abdelrahman Roberts in my presence, and it is both accurate and complete.    The note accurately reflects work and decisions made by me.  Buddy Miramontes  8/17/2017  4:23 PM

## 2021-03-03 DIAGNOSIS — Z23 NEED FOR VACCINATION: ICD-10-CM
